# Patient Record
Sex: FEMALE | Race: WHITE | Employment: OTHER | URBAN - METROPOLITAN AREA
[De-identification: names, ages, dates, MRNs, and addresses within clinical notes are randomized per-mention and may not be internally consistent; named-entity substitution may affect disease eponyms.]

---

## 2023-07-20 ENCOUNTER — TRANSCRIBE ORDERS (OUTPATIENT)
Facility: HOSPITAL | Age: 83
End: 2023-07-20

## 2023-07-20 ENCOUNTER — HOSPITAL ENCOUNTER (OUTPATIENT)
Facility: HOSPITAL | Age: 83
Discharge: HOME | End: 2023-07-20
Payer: MEDICARE

## 2023-07-20 DIAGNOSIS — R22.41 LOCALIZED SWELLING, MASS, OR LUMP OF LOWER EXTREMITY, RIGHT: Primary | ICD-10-CM

## 2023-07-20 DIAGNOSIS — R22.41 LOCALIZED SWELLING, MASS, OR LUMP OF LOWER EXTREMITY, RIGHT: ICD-10-CM

## 2023-07-20 PROCEDURE — 93971 EXTREMITY STUDY: CPT

## 2024-07-18 ENCOUNTER — HOSPITAL ENCOUNTER (INPATIENT)
Facility: HOSPITAL | Age: 84
LOS: 1 days | Discharge: HOME | End: 2024-07-19
Attending: INTERNAL MEDICINE | Admitting: INTERNAL MEDICINE
Payer: MEDICARE

## 2024-07-18 DIAGNOSIS — I48.91 ATRIAL FIBRILLATION, UNSPECIFIED TYPE (HCC): Primary | ICD-10-CM

## 2024-07-18 PROBLEM — I21.4 NSTEMI (NON-ST ELEVATED MYOCARDIAL INFARCTION) (HCC): Status: ACTIVE | Noted: 2024-07-18

## 2024-07-18 PROBLEM — I21.4 NSTEMI (NON-ST ELEVATED MYOCARDIAL INFARCTION) (HCC): Status: RESOLVED | Noted: 2024-07-18 | Resolved: 2024-07-18

## 2024-07-18 LAB
ANION GAP SERPL CALC-SCNC: 8 MMOL/L (ref 5–15)
BASOPHILS # BLD: 0.1 K/UL (ref 0–0.1)
BASOPHILS NFR BLD: 1 % (ref 0–1)
BUN SERPL-MCNC: 19 MG/DL (ref 6–20)
BUN/CREAT SERPL: 21 (ref 12–20)
CALCIUM SERPL-MCNC: 8.5 MG/DL (ref 8.5–10.1)
CHLORIDE SERPL-SCNC: 110 MMOL/L (ref 97–108)
CO2 SERPL-SCNC: 26 MMOL/L (ref 21–32)
CREAT SERPL-MCNC: 0.89 MG/DL (ref 0.55–1.02)
DIFFERENTIAL METHOD BLD: NORMAL
EOSINOPHIL # BLD: 0.3 K/UL (ref 0–0.4)
EOSINOPHIL NFR BLD: 4 % (ref 0–7)
ERYTHROCYTE [DISTWIDTH] IN BLOOD BY AUTOMATED COUNT: 14.4 % (ref 11.5–14.5)
GLUCOSE SERPL-MCNC: 101 MG/DL (ref 65–100)
HCT VFR BLD AUTO: 39 % (ref 35–47)
HGB BLD-MCNC: 12.6 G/DL (ref 11.5–16)
IMM GRANULOCYTES # BLD AUTO: 0 K/UL (ref 0–0.04)
IMM GRANULOCYTES NFR BLD AUTO: 0 % (ref 0–0.5)
LYMPHOCYTES # BLD: 2.4 K/UL (ref 0.8–3.5)
LYMPHOCYTES NFR BLD: 26 % (ref 12–49)
MCH RBC QN AUTO: 29.9 PG (ref 26–34)
MCHC RBC AUTO-ENTMCNC: 32.3 G/DL (ref 30–36.5)
MCV RBC AUTO: 92.6 FL (ref 80–99)
MONOCYTES # BLD: 0.8 K/UL (ref 0–1)
MONOCYTES NFR BLD: 9 % (ref 5–13)
NEUTS SEG # BLD: 5.8 K/UL (ref 1.8–8)
NEUTS SEG NFR BLD: 60 % (ref 32–75)
NRBC # BLD: 0 K/UL (ref 0–0.01)
NRBC BLD-RTO: 0 PER 100 WBC
PLATELET # BLD AUTO: 189 K/UL (ref 150–400)
PMV BLD AUTO: 10.9 FL (ref 8.9–12.9)
POTASSIUM SERPL-SCNC: 3.7 MMOL/L (ref 3.5–5.1)
RBC # BLD AUTO: 4.21 M/UL (ref 3.8–5.2)
SODIUM SERPL-SCNC: 144 MMOL/L (ref 136–145)
TROPONIN I SERPL HS-MCNC: 13 NG/L (ref 0–51)
WBC # BLD AUTO: 9.5 K/UL (ref 3.6–11)

## 2024-07-18 PROCEDURE — 85025 COMPLETE CBC W/AUTO DIFF WBC: CPT

## 2024-07-18 PROCEDURE — 2060000000 HC ICU INTERMEDIATE R&B

## 2024-07-18 PROCEDURE — 84484 ASSAY OF TROPONIN QUANT: CPT

## 2024-07-18 PROCEDURE — 36415 COLL VENOUS BLD VENIPUNCTURE: CPT

## 2024-07-18 PROCEDURE — 80048 BASIC METABOLIC PNL TOTAL CA: CPT

## 2024-07-18 PROCEDURE — 6370000000 HC RX 637 (ALT 250 FOR IP): Performed by: INTERNAL MEDICINE

## 2024-07-18 PROCEDURE — 93005 ELECTROCARDIOGRAM TRACING: CPT | Performed by: INTERNAL MEDICINE

## 2024-07-18 PROCEDURE — 2580000003 HC RX 258: Performed by: INTERNAL MEDICINE

## 2024-07-18 RX ORDER — ROSUVASTATIN CALCIUM 5 MG/1
5 TABLET, COATED ORAL DAILY
Status: DISCONTINUED | OUTPATIENT
Start: 2024-07-18 | End: 2024-07-19 | Stop reason: HOSPADM

## 2024-07-18 RX ORDER — ENOXAPARIN SODIUM 100 MG/ML
40 INJECTION SUBCUTANEOUS DAILY
Status: DISCONTINUED | OUTPATIENT
Start: 2024-07-18 | End: 2024-07-19 | Stop reason: HOSPADM

## 2024-07-18 RX ORDER — PRAMIPEXOLE DIHYDROCHLORIDE 1 MG/1
1 TABLET ORAL 2 TIMES DAILY
Status: DISCONTINUED | OUTPATIENT
Start: 2024-07-18 | End: 2024-07-19 | Stop reason: HOSPADM

## 2024-07-18 RX ORDER — ONDANSETRON 2 MG/ML
4 INJECTION INTRAMUSCULAR; INTRAVENOUS EVERY 6 HOURS PRN
Status: DISCONTINUED | OUTPATIENT
Start: 2024-07-18 | End: 2024-07-19 | Stop reason: HOSPADM

## 2024-07-18 RX ORDER — AMLODIPINE BESYLATE 5 MG/1
5 TABLET ORAL DAILY
Status: DISCONTINUED | OUTPATIENT
Start: 2024-07-18 | End: 2024-07-19

## 2024-07-18 RX ORDER — TRIAMTERENE AND HYDROCHLOROTHIAZIDE 37.5; 25 MG/1; MG/1
1 TABLET ORAL DAILY
COMMUNITY
Start: 2021-06-21

## 2024-07-18 RX ORDER — ONDANSETRON 4 MG/1
4 TABLET, ORALLY DISINTEGRATING ORAL EVERY 8 HOURS PRN
Status: DISCONTINUED | OUTPATIENT
Start: 2024-07-18 | End: 2024-07-19 | Stop reason: HOSPADM

## 2024-07-18 RX ORDER — SODIUM CHLORIDE 9 MG/ML
INJECTION, SOLUTION INTRAVENOUS PRN
Status: DISCONTINUED | OUTPATIENT
Start: 2024-07-18 | End: 2024-07-19 | Stop reason: HOSPADM

## 2024-07-18 RX ORDER — PRAMIPEXOLE DIHYDROCHLORIDE 1 MG/1
1 TABLET ORAL 2 TIMES DAILY
COMMUNITY
Start: 2022-01-17

## 2024-07-18 RX ORDER — LOSARTAN POTASSIUM 100 MG/1
1 TABLET ORAL DAILY
COMMUNITY
Start: 2021-06-21

## 2024-07-18 RX ORDER — ASPIRIN 81 MG/1
81 TABLET, CHEWABLE ORAL DAILY
Status: DISCONTINUED | OUTPATIENT
Start: 2024-07-18 | End: 2024-07-19

## 2024-07-18 RX ORDER — TRIAMTERENE AND HYDROCHLOROTHIAZIDE 37.5; 25 MG/1; MG/1
1 TABLET ORAL DAILY
Status: DISCONTINUED | OUTPATIENT
Start: 2024-07-18 | End: 2024-07-19 | Stop reason: HOSPADM

## 2024-07-18 RX ORDER — CHOLESTYRAMINE LIGHT 4 G/5.7G
4 POWDER, FOR SUSPENSION ORAL
Status: DISCONTINUED | OUTPATIENT
Start: 2024-07-18 | End: 2024-07-19 | Stop reason: HOSPADM

## 2024-07-18 RX ORDER — SODIUM CHLORIDE 0.9 % (FLUSH) 0.9 %
5-40 SYRINGE (ML) INJECTION PRN
Status: DISCONTINUED | OUTPATIENT
Start: 2024-07-18 | End: 2024-07-19 | Stop reason: HOSPADM

## 2024-07-18 RX ORDER — CHOLESTYRAMINE 4 G/9G
4 POWDER, FOR SUSPENSION ORAL
COMMUNITY
Start: 2023-01-12

## 2024-07-18 RX ORDER — ACETAMINOPHEN 650 MG/1
650 SUPPOSITORY RECTAL EVERY 6 HOURS PRN
Status: DISCONTINUED | OUTPATIENT
Start: 2024-07-18 | End: 2024-07-19 | Stop reason: HOSPADM

## 2024-07-18 RX ORDER — PAROXETINE HYDROCHLORIDE 20 MG/1
1 TABLET, FILM COATED ORAL DAILY
COMMUNITY
Start: 2021-06-14

## 2024-07-18 RX ORDER — AMLODIPINE BESYLATE 5 MG/1
5 TABLET ORAL DAILY
Status: ON HOLD | COMMUNITY
Start: 2022-05-10 | End: 2024-07-19 | Stop reason: HOSPADM

## 2024-07-18 RX ORDER — FLUTICASONE FUROATE AND VILANTEROL TRIFENATATE 100; 25 UG/1; UG/1
1 POWDER RESPIRATORY (INHALATION) DAILY
COMMUNITY
Start: 2024-06-05

## 2024-07-18 RX ORDER — CHOLESTYRAMINE 4 G/9G
1 POWDER, FOR SUSPENSION ORAL
Status: DISCONTINUED | OUTPATIENT
Start: 2024-07-18 | End: 2024-07-18

## 2024-07-18 RX ORDER — ACETAMINOPHEN 325 MG/1
650 TABLET ORAL EVERY 6 HOURS PRN
Status: DISCONTINUED | OUTPATIENT
Start: 2024-07-18 | End: 2024-07-19 | Stop reason: HOSPADM

## 2024-07-18 RX ORDER — POLYETHYLENE GLYCOL 3350 17 G/17G
17 POWDER, FOR SOLUTION ORAL DAILY PRN
Status: DISCONTINUED | OUTPATIENT
Start: 2024-07-18 | End: 2024-07-19 | Stop reason: HOSPADM

## 2024-07-18 RX ORDER — PAROXETINE HYDROCHLORIDE 20 MG/1
20 TABLET, FILM COATED ORAL DAILY
Status: DISCONTINUED | OUTPATIENT
Start: 2024-07-18 | End: 2024-07-19 | Stop reason: HOSPADM

## 2024-07-18 RX ORDER — LOSARTAN POTASSIUM 100 MG/1
100 TABLET ORAL DAILY
Status: DISCONTINUED | OUTPATIENT
Start: 2024-07-18 | End: 2024-07-19 | Stop reason: HOSPADM

## 2024-07-18 RX ORDER — METOPROLOL SUCCINATE 100 MG/1
50 TABLET, EXTENDED RELEASE ORAL DAILY
Status: ON HOLD | COMMUNITY
Start: 2022-02-04 | End: 2024-07-19 | Stop reason: HOSPADM

## 2024-07-18 RX ORDER — ASPIRIN 81 MG/1
81 TABLET, CHEWABLE ORAL DAILY
COMMUNITY

## 2024-07-18 RX ORDER — ROSUVASTATIN CALCIUM 5 MG/1
1 TABLET, COATED ORAL DAILY
COMMUNITY
Start: 2021-09-20

## 2024-07-18 RX ORDER — SODIUM CHLORIDE 0.9 % (FLUSH) 0.9 %
5-40 SYRINGE (ML) INJECTION EVERY 12 HOURS SCHEDULED
Status: DISCONTINUED | OUTPATIENT
Start: 2024-07-18 | End: 2024-07-19 | Stop reason: HOSPADM

## 2024-07-18 RX ADMIN — PRAMIPEXOLE DIHYDROCHLORIDE 1 MG: 1 TABLET ORAL at 21:48

## 2024-07-18 RX ADMIN — CHOLESTYRAMINE 4 G: 4 POWDER, FOR SUSPENSION ORAL at 21:49

## 2024-07-18 RX ADMIN — ASPIRIN 81 MG: 81 TABLET, CHEWABLE ORAL at 21:49

## 2024-07-18 RX ADMIN — PAROXETINE HYDROCHLORIDE 20 MG: 20 TABLET, FILM COATED ORAL at 21:48

## 2024-07-18 RX ADMIN — LOSARTAN POTASSIUM 100 MG: 100 TABLET, FILM COATED ORAL at 21:49

## 2024-07-18 RX ADMIN — AMLODIPINE BESYLATE 5 MG: 5 TABLET ORAL at 21:48

## 2024-07-18 RX ADMIN — SODIUM CHLORIDE, PRESERVATIVE FREE 10 ML: 5 INJECTION INTRAVENOUS at 21:51

## 2024-07-18 RX ADMIN — ROSUVASTATIN CALCIUM 5 MG: 5 TABLET, COATED ORAL at 21:48

## 2024-07-18 NOTE — PROGRESS NOTES
1800- pt has arrived on floor. Pt is alert and oriented resting in bed at this time. Pt was having dizzy spells and went to her PCP. Pt was having 2-5 second pauses per report. Pt now in SR, no pauses at this time (EKG complete). MD notified of pt arrival to floor. Pt indicates she would like to be a DNR, MD made aware.

## 2024-07-18 NOTE — H&P
Sent here for card eval- they dw Dr Reyes from [facility]    Here- vitals stable, cbc bmp unremarkable. TSH- at [facility]- normal, probnp 300. CTH-no acute changes     We were asked to admit for work up and evaluation of the above problems.     No past medical history on file.     No past surgical history on file.    Social History     Tobacco Use    Smoking status: Never    Smokeless tobacco: Never   Substance Use Topics    Alcohol use: Not on file        No family history on file.    No Known Allergies     Prior to Admission medications    Not on File         Objective:   VITALS:    Patient Vitals for the past 24 hrs:   BP Temp Temp src Pulse Resp Height Weight   24 1818 -- -- -- -- -- 1.6 m (5' 3\") 83.9 kg (185 lb)   24 1800 (!) 148/78 97.9 °F (36.6 °C) Oral 66 14 -- --       Temp (24hrs), Av.9 °F (36.6 °C), Min:97.9 °F (36.6 °C), Max:97.9 °F (36.6 °C)             Wt Readings from Last 12 Encounters:   24 83.9 kg (185 lb)         PHYSICAL EXAM:  General:    Alert, cooperative, appears stated age.     Lungs:   CTA b/l.  No wheezing or Rhonchi. No rales.  Chest wall:  No tenderness.  No accessory muscle use.  Heart:   Regular  rhythm,  No  Murmur.   No edema  Abdomen:   Soft, NT. ND  BS+  Extremities: No cyanosis.  No clubbing,      Skin turgor normal, Radial dial pulse 2+. Capillary refill normal  Neurologic: No facial asymmetry. No aphasia or slurred speech. Symmetrical strength, Sensation grossly intact. AAOx4.         LAB DATA REVIEWED:    Recent Results (from the past 12 hour(s))   CBC with Auto Differential    Collection Time: 24  6:29 PM   Result Value Ref Range    WBC 9.5 3.6 - 11.0 K/uL    RBC 4.21 3.80 - 5.20 M/uL    Hemoglobin 12.6 11.5 - 16.0 g/dL    Hematocrit 39.0 35.0 - 47.0 %    MCV 92.6 80.0 - 99.0 FL    MCH 29.9 26.0 - 34.0 PG    MCHC 32.3 30.0 - 36.5 g/dL    RDW 14.4 11.5 - 14.5 %    Platelets 189 150 - 400 K/uL    MPV 10.9 8.9 - 12.9 FL    Nucleated RBCs 0.0 0  WBC    nRBC  0.00 0.00 - 0.01 K/uL    Neutrophils % 60 32 - 75 %    Lymphocytes % 26 12 - 49 %    Monocytes % 9 5 - 13 %    Eosinophils % 4 0 - 7 %    Basophils % 1 0 - 1 %    Immature Granulocytes % 0 0.0 - 0.5 %    Neutrophils Absolute 5.8 1.8 - 8.0 K/UL    Lymphocytes Absolute 2.4 0.8 - 3.5 K/UL    Monocytes Absolute 0.8 0.0 - 1.0 K/UL    Eosinophils Absolute 0.3 0.0 - 0.4 K/UL    Basophils Absolute 0.1 0.0 - 0.1 K/UL    Immature Granulocytes Absolute 0.0 0.00 - 0.04 K/UL    Differential Type AUTOMATED     EKG 12 Lead    Collection Time: 07/18/24  6:43 PM   Result Value Ref Range    Ventricular Rate 57 BPM    Atrial Rate 57 BPM    P-R Interval 172 ms    QRS Duration 94 ms    Q-T Interval 430 ms    QTc Calculation (Bazett) 418 ms    P Axis 0 degrees    R Axis -56 degrees    T Axis 21 degrees    Diagnosis       Sinus bradycardia  Left anterior fascicular block  Moderate voltage criteria for LVH, may be normal variant ( R in aVL , [place]   product )  Abnormal ECG  No previous ECGs available           CT head wo IV contrast    Result Date: 7/18/2024  STUDY: CT OF THE HEAD WITHOUT IV CONTRAST HISTORY: dizziness x 'months'; sent for new onset a fib TECHNIQUE: CT imaging was performed through the head without IV contrast. Axial, sagittal, and coronal images are provided. COMPARISON: MRI from February 2024 FINDINGS: Bony structures are intact. No significant sinus disease. Mastoid air cells are clear. Vascular calcifications noted. Orbits are unremarkable. Evidence of prior lens replacement. Ventricles and cortical sulci are stable. Mild chronic periventricular subcortical white matter changes. It is no mass, hemorrhage or extra-axial fluid collection. No cerebral edema or midline shift. Gray-white matter remains well differentiated. Basilar cisterns are clear.    No acute finding. Dictated By: Maritza Dixon Electronically Verified by: Maritza Dixon 7/18/2024 12:04 PM    XR CHEST 1 VIEW    Result Date: 7/18/2024  STUDY:  XR CHEST

## 2024-07-19 ENCOUNTER — APPOINTMENT (OUTPATIENT)
Facility: HOSPITAL | Age: 84
End: 2024-07-19
Attending: INTERNAL MEDICINE
Payer: MEDICARE

## 2024-07-19 VITALS
RESPIRATION RATE: 20 BRPM | TEMPERATURE: 98 F | WEIGHT: 185 LBS | OXYGEN SATURATION: 97 % | HEART RATE: 75 BPM | SYSTOLIC BLOOD PRESSURE: 140 MMHG | BODY MASS INDEX: 32.78 KG/M2 | HEIGHT: 63 IN | DIASTOLIC BLOOD PRESSURE: 65 MMHG

## 2024-07-19 LAB
ALBUMIN SERPL-MCNC: 3.5 G/DL (ref 3.5–5)
ALBUMIN/GLOB SERPL: 1.1 (ref 1.1–2.2)
ALP SERPL-CCNC: 50 U/L (ref 45–117)
ALT SERPL-CCNC: 19 U/L (ref 12–78)
ANION GAP SERPL CALC-SCNC: 5 MMOL/L (ref 5–15)
AST SERPL-CCNC: 14 U/L (ref 15–37)
BASOPHILS # BLD: 0.1 K/UL (ref 0–0.1)
BASOPHILS NFR BLD: 1 % (ref 0–1)
BILIRUB SERPL-MCNC: 1.4 MG/DL (ref 0.2–1)
BUN SERPL-MCNC: 18 MG/DL (ref 6–20)
BUN/CREAT SERPL: 22 (ref 12–20)
CALCIUM SERPL-MCNC: 9 MG/DL (ref 8.5–10.1)
CHLORIDE SERPL-SCNC: 110 MMOL/L (ref 97–108)
CO2 SERPL-SCNC: 27 MMOL/L (ref 21–32)
CREAT SERPL-MCNC: 0.82 MG/DL (ref 0.55–1.02)
DIFFERENTIAL METHOD BLD: NORMAL
ECHO AV AREA PEAK VELOCITY: 2 CM2
ECHO AV AREA PEAK VELOCITY: 2 CM2
ECHO AV AREA PEAK VELOCITY: 2.1 CM2
ECHO AV AREA PEAK VELOCITY: 2.1 CM2
ECHO AV AREA VTI: 2.1 CM2
ECHO AV AREA/BSA VTI: 1.1 CM2/M2
ECHO AV MEAN GRADIENT: 9 MMHG
ECHO AV MEAN VELOCITY: 1.4 M/S
ECHO AV PEAK GRADIENT: 16 MMHG
ECHO AV PEAK GRADIENT: 17 MMHG
ECHO AV PEAK VELOCITY: 2 M/S
ECHO AV PEAK VELOCITY: 2 M/S
ECHO AV VTI: 49.8 CM
ECHO BSA: 1.93 M2
ECHO LA VOL A-L A2C: 54 ML (ref 22–52)
ECHO LA VOL A-L A4C: 39 ML (ref 22–52)
ECHO LA VOL MOD A2C: 52 ML (ref 22–52)
ECHO LA VOL MOD A4C: 37 ML (ref 22–52)
ECHO LA VOLUME AREA LENGTH: 50 ML
ECHO LA VOLUME INDEX A-L A2C: 29 ML/M2 (ref 16–34)
ECHO LA VOLUME INDEX A-L A4C: 21 ML/M2 (ref 16–34)
ECHO LA VOLUME INDEX AREA LENGTH: 27 ML/M2 (ref 16–34)
ECHO LA VOLUME INDEX MOD A2C: 28 ML/M2 (ref 16–34)
ECHO LA VOLUME INDEX MOD A4C: 20 ML/M2 (ref 16–34)
ECHO LV E' SEPTAL VELOCITY: 8 CM/S
ECHO LV FRACTIONAL SHORTENING: 38 % (ref 28–44)
ECHO LV INTERNAL DIMENSION DIASTOLE INDEX: 2.51 CM/M2
ECHO LV INTERNAL DIMENSION DIASTOLIC MMODE: 4.7 CM (ref 3.9–5.3)
ECHO LV INTERNAL DIMENSION DIASTOLIC: 4.7 CM (ref 3.9–5.3)
ECHO LV INTERNAL DIMENSION SYSTOLIC INDEX: 1.55 CM/M2
ECHO LV INTERNAL DIMENSION SYSTOLIC MMODE: 2.8 CM
ECHO LV INTERNAL DIMENSION SYSTOLIC: 2.9 CM
ECHO LV IVSD MMODE: 0.9 CM (ref 0.6–0.9)
ECHO LV IVSD: 0.9 CM (ref 0.6–0.9)
ECHO LV IVSS MMODE: 1.3 CM
ECHO LV MASS 2D: 142.7 G (ref 67–162)
ECHO LV MASS INDEX 2D: 76.3 G/M2 (ref 43–95)
ECHO LV POSTERIOR WALL DIASTOLIC MMODE: 0.9 CM (ref 0.6–0.9)
ECHO LV POSTERIOR WALL DIASTOLIC: 0.9 CM (ref 0.6–0.9)
ECHO LV POSTERIOR WALL SYSTOLIC MMODE: 1.3 CM
ECHO LV RELATIVE WALL THICKNESS RATIO: 0.38
ECHO LVOT AREA: 3.1 CM2
ECHO LVOT AV VTI INDEX: 0.67
ECHO LVOT DIAM: 2 CM
ECHO LVOT MEAN GRADIENT: 3 MMHG
ECHO LVOT PEAK GRADIENT: 7 MMHG
ECHO LVOT PEAK GRADIENT: 7 MMHG
ECHO LVOT PEAK VELOCITY: 1.3 M/S
ECHO LVOT PEAK VELOCITY: 1.3 M/S
ECHO LVOT STROKE VOLUME INDEX: 56.3 ML/M2
ECHO LVOT SV: 105.2 ML
ECHO LVOT VTI: 33.5 CM
ECHO MV A VELOCITY: 1.05 M/S
ECHO MV AREA VTI: 2.2 CM2
ECHO MV E DECELERATION TIME (DT): 237.6 MS
ECHO MV E VELOCITY: 0.86 M/S
ECHO MV E/A RATIO: 0.82
ECHO MV E/E' SEPTAL: 10.75
ECHO MV LVOT VTI INDEX: 1.45
ECHO MV MAX VELOCITY: 1.5 M/S
ECHO MV MEAN GRADIENT: 4 MMHG
ECHO MV MEAN VELOCITY: 0.9 M/S
ECHO MV PEAK GRADIENT: 9 MMHG
ECHO MV VTI: 48.5 CM
ECHO RV FREE WALL PEAK S': 15 CM/S
ECHO RV INTERNAL DIMENSION: 3.8 CM
EKG ATRIAL RATE: 57 BPM
EKG DIAGNOSIS: NORMAL
EKG P AXIS: 0 DEGREES
EKG P-R INTERVAL: 172 MS
EKG Q-T INTERVAL: 430 MS
EKG QRS DURATION: 94 MS
EKG QTC CALCULATION (BAZETT): 418 MS
EKG R AXIS: -56 DEGREES
EKG T AXIS: 21 DEGREES
EKG VENTRICULAR RATE: 57 BPM
EOSINOPHIL # BLD: 0.4 K/UL (ref 0–0.4)
EOSINOPHIL NFR BLD: 5 % (ref 0–7)
ERYTHROCYTE [DISTWIDTH] IN BLOOD BY AUTOMATED COUNT: 14.2 % (ref 11.5–14.5)
GLOBULIN SER CALC-MCNC: 3.2 G/DL (ref 2–4)
GLUCOSE SERPL-MCNC: 115 MG/DL (ref 65–100)
HCT VFR BLD AUTO: 42.2 % (ref 35–47)
HGB BLD-MCNC: 13.1 G/DL (ref 11.5–16)
IMM GRANULOCYTES # BLD AUTO: 0 K/UL (ref 0–0.04)
IMM GRANULOCYTES NFR BLD AUTO: 0 % (ref 0–0.5)
LACTATE SERPL-SCNC: 1.1 MMOL/L (ref 0.4–2)
LYMPHOCYTES # BLD: 2.1 K/UL (ref 0.8–3.5)
LYMPHOCYTES NFR BLD: 23 % (ref 12–49)
MCH RBC QN AUTO: 29.3 PG (ref 26–34)
MCHC RBC AUTO-ENTMCNC: 31 G/DL (ref 30–36.5)
MCV RBC AUTO: 94.4 FL (ref 80–99)
MONOCYTES # BLD: 0.8 K/UL (ref 0–1)
MONOCYTES NFR BLD: 9 % (ref 5–13)
NEUTS SEG # BLD: 5.5 K/UL (ref 1.8–8)
NEUTS SEG NFR BLD: 62 % (ref 32–75)
NRBC # BLD: 0 K/UL (ref 0–0.01)
NRBC BLD-RTO: 0 PER 100 WBC
PHOSPHATE SERPL-MCNC: 3.4 MG/DL (ref 2.6–4.7)
PLATELET # BLD AUTO: 184 K/UL (ref 150–400)
PMV BLD AUTO: 10.7 FL (ref 8.9–12.9)
POTASSIUM SERPL-SCNC: 3.7 MMOL/L (ref 3.5–5.1)
PROT SERPL-MCNC: 6.7 G/DL (ref 6.4–8.2)
RBC # BLD AUTO: 4.47 M/UL (ref 3.8–5.2)
SODIUM SERPL-SCNC: 142 MMOL/L (ref 136–145)
WBC # BLD AUTO: 8.9 K/UL (ref 3.6–11)

## 2024-07-19 PROCEDURE — 36415 COLL VENOUS BLD VENIPUNCTURE: CPT

## 2024-07-19 PROCEDURE — 6370000000 HC RX 637 (ALT 250 FOR IP): Performed by: INTERNAL MEDICINE

## 2024-07-19 PROCEDURE — 93306 TTE W/DOPPLER COMPLETE: CPT

## 2024-07-19 PROCEDURE — 94640 AIRWAY INHALATION TREATMENT: CPT

## 2024-07-19 PROCEDURE — 83605 ASSAY OF LACTIC ACID: CPT

## 2024-07-19 PROCEDURE — 85025 COMPLETE CBC W/AUTO DIFF WBC: CPT

## 2024-07-19 PROCEDURE — 6360000002 HC RX W HCPCS: Performed by: INTERNAL MEDICINE

## 2024-07-19 PROCEDURE — 84100 ASSAY OF PHOSPHORUS: CPT

## 2024-07-19 PROCEDURE — 80053 COMPREHEN METABOLIC PANEL: CPT

## 2024-07-19 PROCEDURE — 2580000003 HC RX 258: Performed by: INTERNAL MEDICINE

## 2024-07-19 RX ORDER — FLECAINIDE ACETATE 50 MG/1
50 TABLET ORAL EVERY 12 HOURS SCHEDULED
Qty: 60 TABLET | Refills: 3 | Status: SHIPPED | OUTPATIENT
Start: 2024-07-19

## 2024-07-19 RX ORDER — FLECAINIDE ACETATE 50 MG/1
50 TABLET ORAL EVERY 12 HOURS SCHEDULED
Status: DISCONTINUED | OUTPATIENT
Start: 2024-07-19 | End: 2024-07-19 | Stop reason: HOSPADM

## 2024-07-19 RX ORDER — DILTIAZEM HYDROCHLORIDE 180 MG/1
180 CAPSULE, COATED, EXTENDED RELEASE ORAL DAILY
Qty: 30 CAPSULE | Refills: 3 | Status: SHIPPED | OUTPATIENT
Start: 2024-07-20

## 2024-07-19 RX ORDER — DILTIAZEM HYDROCHLORIDE 180 MG/1
180 CAPSULE, COATED, EXTENDED RELEASE ORAL DAILY
Status: DISCONTINUED | OUTPATIENT
Start: 2024-07-20 | End: 2024-07-19 | Stop reason: HOSPADM

## 2024-07-19 RX ADMIN — SODIUM CHLORIDE, PRESERVATIVE FREE 10 ML: 5 INJECTION INTRAVENOUS at 08:24

## 2024-07-19 RX ADMIN — RIVAROXABAN 20 MG: 20 TABLET, FILM COATED ORAL at 17:16

## 2024-07-19 RX ADMIN — PAROXETINE HYDROCHLORIDE 20 MG: 20 TABLET, FILM COATED ORAL at 08:23

## 2024-07-19 RX ADMIN — AMLODIPINE BESYLATE 5 MG: 5 TABLET ORAL at 08:23

## 2024-07-19 RX ADMIN — CHOLESTYRAMINE 4 G: 4 POWDER, FOR SUSPENSION ORAL at 08:23

## 2024-07-19 RX ADMIN — LOSARTAN POTASSIUM 100 MG: 100 TABLET, FILM COATED ORAL at 08:23

## 2024-07-19 RX ADMIN — ARFORMOTEROL TARTRATE: 15 SOLUTION RESPIRATORY (INHALATION) at 07:45

## 2024-07-19 RX ADMIN — CHOLESTYRAMINE 4 G: 4 POWDER, FOR SUSPENSION ORAL at 11:45

## 2024-07-19 RX ADMIN — PRAMIPEXOLE DIHYDROCHLORIDE 1 MG: 1 TABLET ORAL at 08:22

## 2024-07-19 RX ADMIN — ASPIRIN 81 MG: 81 TABLET, CHEWABLE ORAL at 08:23

## 2024-07-19 RX ADMIN — ROSUVASTATIN CALCIUM 5 MG: 5 TABLET, COATED ORAL at 08:22

## 2024-07-19 NOTE — PROGRESS NOTES
0700. Bedside shift change report given to Raúl RN (oncoming nurse) by Lani RN (offgoing nurse). Report included the following information Nurse Handoff Report, Index, Intake/Output, MAR, Recent Results, and Cardiac Rhythm NSR .     1800. Patient discharged home with family following removal of PIV. Explanation of discharge instructions, all questions answered and all belongings with patient at time of discharge.

## 2024-07-19 NOTE — CONSULTS
EP/ ARRHYTHMIA CONSULT requested secondary to paroxysmal atrial fibrillation    Patient ID:  Patient: Shruti Anton  MRN: [id number]  Age: 83 y.o.  : 1940    Date of  Admission: 2024  6:06 PM   PCP:  Sulema Woods MD    Assessment:   Paroxysmal atrial fibrillation.  Nonvalvular.  Sinus node dysfunction, some of which is extrinsic and provoked by beta-blocker.  Post-conversion pause documented last night 3.12 seconds with adequate sinus rates after this.  Left anterior fascicular block.  Hypertension.  Hyperlipidemia.  Sleep apnea.  Anxiety disorder.  Full code.    Plan:     Initially, planned on no metoprolol and using amiodarone for rhythm maintenance.  After discussing the potential benefits, risks of this drug, we've decided on using low dose flecainide 50 mg twice daily.  She'd been on aspirin.  Explained the prospect of anticoagulation for thromboembolic protection given her risk.  We've agree to Xarelto 20 mg daily with dinner.    Atrial Fibrillation CHADSVASC2 Score Stroke Risk:   83 y.o. > 75 - 2    female Female - 1   CHF HX: No - 0   HTN HX: Yes - 1   Stroke/TIA/Thromboembolism No - 0   Vascular Disease HX: No - 0   Diabetes Mellitus No - 0   CHADSVASC 2 Score 4      Annual Stroke Risk 4.8%- moderate-high      Stop amlodipine, use diltiazem  mg daily instead.  Stopped metoprolol for good.    The plan if for rate and rhythm control with anticoagulation.  She is aware that we may need to adjust her regimen further.  Ablation may be an option for her.  IF her tachy-edna character continues, a pacemaker implant is also an option.    She'll need to F/U with me in the office within one month.  All questions answered for her and her family.     [x]       High complexity decision making was performed in this patient    Shruti Anton is a 83 y.o. female with a history of dizziness that is intermittent and started several months ago.  Her dizziness is very

## 2024-07-19 NOTE — CARE COORDINATION
Care Management Initial Assessment       RUR: 7% (Low RUR)  Readmission? No  1st IM letter given? Yes - 07/19  1st  letter given: No     07/19/24 0803   Service Assessment   Patient Orientation Alert and Oriented;Person;Place;Situation;Self   Cognition Alert   History Provided By Patient   Primary Caregiver Self   Support Systems Spouse/Significant Other  (Arnoldo Anton (Spouse) [phone])   Patient's Healthcare Decision Maker is: Legal Next of Kin   PCP Verified by CM Yes   Last Visit to PCP Within last 3 months   Prior Functional Level Independent in ADLs/IADLs   Current Functional Level Independent in ADLs/IADLs   Can patient return to prior living arrangement Yes   Ability to make needs known: Good   Family able to assist with home care needs: Yes   Would you like for me to discuss the discharge plan with any other family members/significant others, and if so, who? Yes  (Arnoldo Anton (Spouse) [phone])   Financial Resources Medicare   Community Resources None   CM/SW Referral Disease Management Education   Social/Functional History   Lives With Spouse   Type of Home House   Home Layout One level   Home Access Stairs to enter with rails   Entrance Stairs - Number of Steps 3   Entrance Stairs - Rails Both   Bathroom Equipment Shower chair   Home Equipment Cane;Walker - Rolling  (cpcp)   Active  Yes   Discharge Planning   Type of Residence House   Living Arrangements Spouse/Significant Other   Current Services Prior To Admission C-pap   Patient expects to be discharged to: House     The  (CM) conducted an initial meeting with the patient at the bedside, formally introducing themselves and clarifying their role in discharge planning and transitional care. Demographic and insurance details were verified for accuracy. Notably, the patient has no prior history with home health, skilled nursing facilities (SNF), or inpatient rehabilitation (IPR). Moreover, the patient is alert, able to

## 2024-07-19 NOTE — DISCHARGE SUMMARY
Discharge Summary    Name: Shruti Anton  [id number]  YOB: 1940 (Age: 83 y.o.)   Date of Admission: 7/18/2024  Date of Discharge: 7/19/2024  Attending Physician: Elsie Ray MD    Discharge Diagnosis:   Paroxysmal atrial fibrillation.  Nonvalvular.  Sinus node dysfunction, some of which is extrinsic and provoked by beta-blocker.  Post-conversion pause documented last night 3.12 seconds with adequate sinus rates after this.  Left anterior fascicular block.  Hypertension.  Hyperlipidemia.  Sleep apnea.  Anxiety disorder.      Consultations:  IP CONSULT TO CARDIOLOGY      Brief Admission History/Reason for Admission Per Diann Cantu MD:   Shruti Anton is a 83 y.o.  female with PMHx significant for htn, hld, restless leg, cory on cpap went to pcp for on and off dizziness- describes as waves of dizziness, no syncope- EKG at pcp office- afib sent to [facility]- there was showing initially afib 1120ss then with sinus pause>? 3 sec. Sent here for card eval- they dw Dr Reyes from [facility]     Here- vitals stable, cbc bmp unremarkable. TSH- at u- normal, probnp 300. CTH-no acute changes     Brief Hospital Course by Main Problems:   Evaluated by cardio - Discontinued Metoprolol . Started on Flecanide 50 mg bid. Amlodipine discontinued and started on Diltiazem  mg daily. Started on Xarelto 20 mg . Needs further evaluation for medications adjustment as outpatient in 1 month. Plan for pacemaker if tachy- edna continues .     Discharge Exam:  Patient seen and examined by me on discharge day.  Pertinent Findings:  Patient Vitals for the past 24 hrs:   BP Temp Temp src Pulse Resp SpO2 Height Weight   07/19/24 1535 (!) 140/65 98 °F (36.7 °C) Oral 75 20 -- -- --   07/19/24 1121 136/65 97.9 °F (36.6 °C) Oral 60 26 97 % -- --   07/19/24 0916 (!) 160/81 -- -- 64 -- -- 1.6 m (5' 2.99\") 83.9 kg (185 lb)   07/19/24 0745 (!) 160/81 97.5 °F (36.4 °C) Oral 64 19 94 % -- --

## 2024-07-19 NOTE — PROGRESS NOTES
Called about this consult.  She reportedly had intermittent pauses earlier today with associated dizziness.  Atrial fibrillation was reported.  Her ECG here shows sinus bradycardia 57 bpm with normal DE and left anterior fascicular block.  She has been on metoprolol as an OP which is being held here.  She may have paroxysmal atrial fibrillation with post-conversion pauses, I don't have any data to review with regards to this at this time.  Keep on tele.    She'll be seen later today, hopefully with more data.  She's on tele.  In the event that she has paroxysmal atrial fibrillation with rapid ventricular response, she may be best served by not using beta-blocker and starting amiodarone.  Ideally done if she has some beta-blocker washout as a headstart.    Myron Barros MD

## 2024-07-19 NOTE — PLAN OF CARE
Problem: Discharge Planning  Goal: Discharge to home or other facility with appropriate resources  7/18/2024 2118 by Jason Conde, RN  Outcome: Progressing  7/18/2024 2116 by Jason Conde, RN  Outcome: Progressing     Problem: ABCDS Injury Assessment  Goal: Absence of physical injury  Outcome: Progressing     Problem: Safety - Adult  Goal: Free from fall injury  Outcome: Progressing

## 2024-07-19 NOTE — DISCHARGE INSTRUCTIONS
HOSPITALIST DISCHARGE INSTRUCTIONS    NAME: Shruti Anton   :  1940   MRN:  [id number]     Date/Time:  2024 4:42 PM    ADMIT DATE: 2024     DISCHARGE DATE: 2024     DISCHARGE DIAGNOSIS:  Paroxysmal atrial fibrillation.  Nonvalvular.  Sinus node dysfunction, some of which is extrinsic and provoked by beta-blocker.  Post-conversion pause documented last night 3.12 seconds with adequate sinus rates after this.  Left anterior fascicular block.  Hypertension.  Hyperlipidemia.  Sleep apnea.  Anxiety disorder.    MEDICATIONS:  As per medication reconciliation  list  It is important that you take the medication exactly as they are prescribed.   Keep your medication in the bottles provided by the pharmacist and keep a list of the medication names, dosages, and times to be taken in your wallet.   Do not take other medications without consulting your doctor.     Pain Management: per above medications    What to do at Home:  Follow up with cardio clinic in 1 month    Recommended diet:  cardiac diet    Recommended activity: activity as tolerated    If you have questions regarding the hospital related prescriptions or hospital related issues please call at .    If you experience any of the following symptoms then please call your primary care physician or return to the emergency room if you cannot get hold of your doctor:  Fever, chills, nausea, vomiting, diarrhea, change in mentation, falling, bleeding, shortness of breath    Follow Up:   @PCP@  you are to call and set up an appointment to see them in 7-10 days.      Information obtained by :  I understand that if any problems occur once I am at home I am to contact my physician.    I understand and acknowledge receipt of the instructions indicated above.                                                                                                                                           Physician's or R.N.'s Signature

## 2024-07-19 NOTE — PROGRESS NOTES
Bedside shift change report given to Jason MONGE (oncoming nurse) by Hunter MONGE (offgoing nurse). Report included the following information Nurse Handoff Report, Index, Intake/Output, MAR, Recent Results, Cardiac Rhythm  , Neuro Assessment, and Event Log.          0400: Tele called and stated that patient had a 3.31 second pause. Patient is asleep at this time    End of Shift Note    Bedside shift change report given to Hieu MONGE (oncoming nurse) by Jason Conde RN (offgoing nurse).  Report included the following information SBAR, Intake/Output, MAR, Recent Results, Med Rec Status, and Cardiac Rhythm      Shift worked:  7pm-7am     Shift summary and any significant changes:     NPO status maintained, lab works done, rested well throughout the night     Concerns for physician to address:  xxx     Zone phone for oncoming shift:   xxx       Activity:     Number times ambulated in hallways past shift: 0  Number of times OOB to chair past shift: 0    Cardiac:   Cardiac Monitoring: Yes           Access:  Current line(s): PIV     Genitourinary:   Urinary status: voiding and external catheter    Respiratory:      Chronic home O2 use?: NO  Incentive spirometer at bedside: NO       GI:     Current diet:  Diet NPO  Passing flatus: YES  Tolerating current diet: YES       Pain Management:   Patient states pain is manageable on current regimen: NO    Skin:     Interventions: float heels, increase time out of bed, PT/OT consult, limit briefs, internal/external urinary devices, and nutritional support    Patient Safety:  Fall Score:    Interventions: bed/chair alarm, assistive device (walker, cane. etc), gripper socks, pt to call before getting OOB, and stay with me (per policy)       Length of Stay:  Expected LOS: 3  Actual LOS: 1      Jason Conde RN

## 2024-07-19 NOTE — PROGRESS NOTES
Hospitalist Progress Note    NAME:   Shruti Anton   : 1940   MRN: [id number]     Date/Time: 2024 2:08 PM  Patient PCP: Sulema Woods MD    Estimated discharge date:   Barriers: Cardio clearance       Assessment / Plan:  Intermittent dizziness   Paroxysmal Atrial fibrillation with possible post conversion pauses     Continue with tele monitoring   EKG on admission noted for sinus bradycardia   Hemodynamically stable at present   Troponin-13, electrolytes WNL ,TSH normal  CXR - unremarkable   Echo on : LVEF 60-65%. LV size and systolic function normal. Grade 1 diastolic dysfunction with normal LAP. LA mildly dilated  Continue to hold Metoprolol   Cardio following  Continue with Aspirin, statin   Will hold off on anticoagulation now        HTN  HLD  YAMILA  Restless leg  ELY  Continue amlodipine.losartan, paroxetine, pramiprexole  Will resume triamterene-hctz as BP tolerates  cpap                          Medical Decision Making:   I personally reviewed labs: cbc bmp  I personally reviewed imaging:ct,xr  I personally reviewed EKG:sinus edna  Toxic drug monitoring:   Discussed case with: Patient, daughter , son and RN        Code Status: full code- dw pt and family son  DVT Prophylaxis: lovenox held for possible procedure    Subjective:     Chief Complaint / Reason for Physician Visit  \"States have this kind of dizziness intermittently since few months. However no LOC. Never been diagnosed with Afib in past.  \".  Discussed with RN events overnight.       Objective:     VITALS:   Last 24hrs VS reviewed since prior progress note. Most recent are:  Patient Vitals for the past 24 hrs:   BP Temp Temp src Pulse Resp SpO2 Height Weight   24 1121 136/65 97.9 °F (36.6 °C) Oral 60 26 97 % -- --   24 0916 (!) 160/81 -- -- 64 -- -- 1.6 m (5' 2.99\") 83.9 kg (185 lb)   24 0745 (!) 160/81 97.5 °F (36.4 °C) Oral 64 19 94 % -- --   24 0405 137/74 97.9 °F (36.6 °C) Oral (!) 47 18

## 2024-09-11 ENCOUNTER — TRANSCRIBE ORDERS (OUTPATIENT)
Facility: HOSPITAL | Age: 84
End: 2024-09-11

## 2024-09-11 DIAGNOSIS — R22.41 LOWER LEG MASS, RIGHT: Primary | ICD-10-CM

## 2024-09-12 ENCOUNTER — HOSPITAL ENCOUNTER (OUTPATIENT)
Facility: HOSPITAL | Age: 84
Discharge: HOME | End: 2024-09-15
Payer: MEDICARE

## 2024-09-12 DIAGNOSIS — R22.41 LOWER LEG MASS, RIGHT: ICD-10-CM

## 2024-09-12 PROCEDURE — 93971 EXTREMITY STUDY: CPT

## 2024-09-17 ENCOUNTER — HOSPITAL ENCOUNTER (OUTPATIENT)
Facility: HOSPITAL | Age: 84
Discharge: HOME | End: 2024-09-19
Attending: INTERNAL MEDICINE

## 2024-09-17 ENCOUNTER — ANESTHESIA (OUTPATIENT)
Facility: HOSPITAL | Age: 84
End: 2024-09-17
Payer: MEDICARE

## 2024-09-17 ENCOUNTER — HOSPITAL ENCOUNTER (OUTPATIENT)
Facility: HOSPITAL | Age: 84
Discharge: HOME | End: 2024-09-17
Attending: INTERNAL MEDICINE | Admitting: INTERNAL MEDICINE
Payer: MEDICARE

## 2024-09-17 ENCOUNTER — ANESTHESIA EVENT (OUTPATIENT)
Facility: HOSPITAL | Age: 84
End: 2024-09-17
Payer: MEDICARE

## 2024-09-17 VITALS
BODY MASS INDEX: 33.66 KG/M2 | TEMPERATURE: 96.8 F | DIASTOLIC BLOOD PRESSURE: 62 MMHG | RESPIRATION RATE: 21 BRPM | SYSTOLIC BLOOD PRESSURE: 144 MMHG | OXYGEN SATURATION: 93 % | HEART RATE: 73 BPM | WEIGHT: 190 LBS | HEIGHT: 63 IN

## 2024-09-17 DIAGNOSIS — I48.91 A-FIB (HCC): ICD-10-CM

## 2024-09-17 DIAGNOSIS — I48.91 AFIB (HCC): ICD-10-CM

## 2024-09-17 LAB
ACT BLD: 287 SECS (ref 79–138)
ACT BLD: 324 SECS (ref 79–138)
ECHO BSA: 1.96 M2

## 2024-09-17 PROCEDURE — C1759 CATH, INTRA ECHOCARDIOGRAPHY: HCPCS | Performed by: INTERNAL MEDICINE

## 2024-09-17 PROCEDURE — C1730 CATH, EP, 19 OR FEW ELECT: HCPCS | Performed by: INTERNAL MEDICINE

## 2024-09-17 PROCEDURE — C1731 CATH, EP, 20 OR MORE ELEC: HCPCS | Performed by: INTERNAL MEDICINE

## 2024-09-17 PROCEDURE — 2709999900 HC NON-CHARGEABLE SUPPLY: Performed by: INTERNAL MEDICINE

## 2024-09-17 PROCEDURE — 6360000002 HC RX W HCPCS: Performed by: NURSE ANESTHETIST, CERTIFIED REGISTERED

## 2024-09-17 PROCEDURE — 85347 COAGULATION TIME ACTIVATED: CPT

## 2024-09-17 PROCEDURE — C1760 CLOSURE DEV, VASC: HCPCS | Performed by: INTERNAL MEDICINE

## 2024-09-17 PROCEDURE — 93656 COMPRE EP EVAL ABLTJ ATR FIB: CPT | Performed by: INTERNAL MEDICINE

## 2024-09-17 PROCEDURE — C1766 INTRO/SHEATH,STRBLE,NON-PEEL: HCPCS | Performed by: INTERNAL MEDICINE

## 2024-09-17 PROCEDURE — C1769 GUIDE WIRE: HCPCS | Performed by: INTERNAL MEDICINE

## 2024-09-17 PROCEDURE — C1894 INTRO/SHEATH, NON-LASER: HCPCS | Performed by: INTERNAL MEDICINE

## 2024-09-17 PROCEDURE — C1733 CATH, EP, OTHR THAN COOL-TIP: HCPCS | Performed by: INTERNAL MEDICINE

## 2024-09-17 PROCEDURE — 3700000001 HC ADD 15 MINUTES (ANESTHESIA): Performed by: INTERNAL MEDICINE

## 2024-09-17 PROCEDURE — 2580000003 HC RX 258: Performed by: NURSE ANESTHETIST, CERTIFIED REGISTERED

## 2024-09-17 PROCEDURE — 2500000003 HC RX 250 WO HCPCS: Performed by: NURSE ANESTHETIST, CERTIFIED REGISTERED

## 2024-09-17 PROCEDURE — 3700000000 HC ANESTHESIA ATTENDED CARE: Performed by: INTERNAL MEDICINE

## 2024-09-17 RX ORDER — PROTAMINE SULFATE 10 MG/ML
INJECTION, SOLUTION INTRAVENOUS
Status: DISCONTINUED | OUTPATIENT
Start: 2024-09-17 | End: 2024-09-17 | Stop reason: SDUPTHER

## 2024-09-17 RX ORDER — ONDANSETRON 2 MG/ML
INJECTION INTRAMUSCULAR; INTRAVENOUS
Status: DISCONTINUED | OUTPATIENT
Start: 2024-09-17 | End: 2024-09-17 | Stop reason: SDUPTHER

## 2024-09-17 RX ORDER — DEXAMETHASONE SODIUM PHOSPHATE 4 MG/ML
INJECTION, SOLUTION INTRA-ARTICULAR; INTRALESIONAL; INTRAMUSCULAR; INTRAVENOUS; SOFT TISSUE
Status: DISCONTINUED | OUTPATIENT
Start: 2024-09-17 | End: 2024-09-17 | Stop reason: SDUPTHER

## 2024-09-17 RX ORDER — ATROPINE SULFATE 0.4 MG/ML
INJECTION, SOLUTION INTRAMUSCULAR; INTRAVENOUS; SUBCUTANEOUS
Status: DISCONTINUED | OUTPATIENT
Start: 2024-09-17 | End: 2024-09-17 | Stop reason: SDUPTHER

## 2024-09-17 RX ORDER — 0.9 % SODIUM CHLORIDE 0.9 %
INTRAVENOUS SOLUTION INTRAVENOUS
Status: DISCONTINUED | OUTPATIENT
Start: 2024-09-17 | End: 2024-09-17 | Stop reason: SDUPTHER

## 2024-09-17 RX ORDER — FENTANYL CITRATE 50 UG/ML
INJECTION, SOLUTION INTRAMUSCULAR; INTRAVENOUS
Status: DISCONTINUED | OUTPATIENT
Start: 2024-09-17 | End: 2024-09-17 | Stop reason: SDUPTHER

## 2024-09-17 RX ORDER — ROCURONIUM BROMIDE 10 MG/ML
INJECTION, SOLUTION INTRAVENOUS
Status: DISCONTINUED | OUTPATIENT
Start: 2024-09-17 | End: 2024-09-17 | Stop reason: SDUPTHER

## 2024-09-17 RX ORDER — HEPARIN SODIUM 1000 [USP'U]/ML
INJECTION, SOLUTION INTRAVENOUS; SUBCUTANEOUS
Status: DISCONTINUED | OUTPATIENT
Start: 2024-09-17 | End: 2024-09-17 | Stop reason: SDUPTHER

## 2024-09-17 RX ORDER — ACETAMINOPHEN 160 MG
TABLET,DISINTEGRATING ORAL
COMMUNITY

## 2024-09-17 RX ORDER — NITROGLYCERIN 0.4 MG/1
0.4 TABLET SUBLINGUAL EVERY 5 MIN PRN
COMMUNITY

## 2024-09-17 RX ADMIN — LIDOCAINE HYDROCHLORIDE 100 MG: 20 INJECTION, SOLUTION EPIDURAL; INFILTRATION; INTRACAUDAL; PERINEURAL at 10:39

## 2024-09-17 RX ADMIN — PROTAMINE SULFATE 70 MG: 10 INJECTION, SOLUTION INTRAVENOUS at 11:46

## 2024-09-17 RX ADMIN — SODIUM CHLORIDE 300 ML: 900 INJECTION, SOLUTION INTRAVENOUS at 12:01

## 2024-09-17 RX ADMIN — PHENYLEPHRINE HYDROCHLORIDE 40 MCG/MIN: 10 INJECTION INTRAVENOUS at 10:50

## 2024-09-17 RX ADMIN — PROPOFOL 50 MG: 10 INJECTION, EMULSION INTRAVENOUS at 10:42

## 2024-09-17 RX ADMIN — PROPOFOL 150 MG: 10 INJECTION, EMULSION INTRAVENOUS at 10:39

## 2024-09-17 RX ADMIN — FENTANYL CITRATE 100 MCG: 50 INJECTION, SOLUTION INTRAMUSCULAR; INTRAVENOUS at 10:39

## 2024-09-17 RX ADMIN — SUGAMMADEX 200 MG: 100 INJECTION, SOLUTION INTRAVENOUS at 11:51

## 2024-09-17 RX ADMIN — ROCURONIUM BROMIDE 50 MG: 10 INJECTION INTRAVENOUS at 10:40

## 2024-09-17 RX ADMIN — ATROPINE SULFATE 0.5 MG: 0.4 INJECTION, SOLUTION INTRAMUSCULAR; INTRAVENOUS; SUBCUTANEOUS at 11:32

## 2024-09-17 RX ADMIN — HEPARIN SODIUM 3000 UNITS: 1000 INJECTION, SOLUTION INTRAVENOUS; SUBCUTANEOUS at 11:27

## 2024-09-17 RX ADMIN — DEXAMETHASONE SODIUM PHOSPHATE 4 MG: 4 INJECTION, SOLUTION INTRAMUSCULAR; INTRAVENOUS at 10:48

## 2024-09-17 RX ADMIN — HEPARIN SODIUM 12000 UNITS: 1000 INJECTION, SOLUTION INTRAVENOUS; SUBCUTANEOUS at 11:05

## 2024-09-17 RX ADMIN — ONDANSETRON HYDROCHLORIDE 4 MG: 2 INJECTION, SOLUTION INTRAMUSCULAR; INTRAVENOUS at 11:49

## 2024-09-17 ASSESSMENT — ENCOUNTER SYMPTOMS: SHORTNESS OF BREATH: 1

## (undated) DEVICE — SUTURE PERMAHAND SZ 0 L30IN NONABSORBABLE BLK L26MM SH 1/2 K834H

## (undated) DEVICE — MEDI-TRACE CADENCE ADULT, DEFIBRILLATION ELECTRODE -RTS  (10 PR/PK) - PHYSIO-CONTROL: Brand: MEDI-TRACE CADENCE

## (undated) DEVICE — CATHETER MAP 8FR W13XH13MMXL105CM SPC 3MM TIP 1MM L ATRIUM

## (undated) DEVICE — CATHETER EP LG 2-8-2 MM 7 FRX95 CM LIVEWIRE

## (undated) DEVICE — PINNACLE INTRODUCER SHEATH: Brand: PINNACLE

## (undated) DEVICE — CABLE EP L150CM RED HEXAPOLAR OCTAPOLAR DECAPOLAR EXTN CONN

## (undated) DEVICE — ELECTRODE PT RET AD L9FT HI MOIST COND ADH HYDRGEL CORDED

## (undated) DEVICE — SYSTEM CLOSURE 6-12 FR VEN VASC VASCADE MVP

## (undated) DEVICE — CABLE EP L150CM BLK HEXAPOLAR OCTAPOLAR DECAPOLAR EXTN CONN

## (undated) DEVICE — 1 X VERSACROSS CONNECT TRANSSEPTAL DILATOR;  1 X VERSACROSS RF WIRE (INCLUDING 1 X CONNECTOR CABLE (SINGLE USE)): Brand: VERSACROSS CONNECT ACCESS SOLUTION FOR FARADRIVE

## (undated) DEVICE — CATHETER REPROC ULTRASOUND 10 FR ACUSON ACUNAV

## (undated) DEVICE — STERILE (15.2 TAPERED TO 7.6 X 183CM) POLYETHYLENE ACCORDION-FOLDED COVER FOR USE WITH SIEMENS ACUNAV ULTRASOUND CATHETER FAMILY CONNECTOR: Brand: SWIFTLINK TRANSDUCER COVER

## (undated) DEVICE — CATHETER CONNECTION CABLE: Brand: FARASTAR™

## (undated) DEVICE — CABLE DIAG FOR ADVISOR HD GRID MAP CATH SENS ENABLED

## (undated) DEVICE — PRESSURE MONITORING SET: Brand: TRUWAVE

## (undated) DEVICE — KIT ELECTRD SURF FOR DISPLAYING THE 3D POS OF EP CATH

## (undated) DEVICE — ROSEN CURVED WIRE GUIDE: Brand: ROSEN

## (undated) DEVICE — STEERABLE SHEATH CLEAR: Brand: FARADRIVE™

## (undated) DEVICE — HEART CATH-MRMC: Brand: MEDLINE INDUSTRIES, INC.

## (undated) DEVICE — PULSED FIELD ABLATION CATHETER: Brand: FARAWAVE™